# Patient Record
Sex: MALE | Race: WHITE | NOT HISPANIC OR LATINO | Employment: OTHER | URBAN - NONMETROPOLITAN AREA
[De-identification: names, ages, dates, MRNs, and addresses within clinical notes are randomized per-mention and may not be internally consistent; named-entity substitution may affect disease eponyms.]

---

## 2021-09-17 ENCOUNTER — APPOINTMENT (EMERGENCY)
Dept: CT IMAGING | Facility: HOSPITAL | Age: 76
End: 2021-09-17
Payer: COMMERCIAL

## 2021-09-17 ENCOUNTER — HOSPITAL ENCOUNTER (EMERGENCY)
Facility: HOSPITAL | Age: 76
Discharge: ANOTHER HOSPITAL | End: 2021-09-17
Attending: EMERGENCY MEDICINE | Admitting: EMERGENCY MEDICINE
Payer: COMMERCIAL

## 2021-09-17 ENCOUNTER — APPOINTMENT (EMERGENCY)
Dept: RADIOLOGY | Facility: HOSPITAL | Age: 76
End: 2021-09-17
Payer: COMMERCIAL

## 2021-09-17 VITALS
HEART RATE: 70 BPM | SYSTOLIC BLOOD PRESSURE: 166 MMHG | RESPIRATION RATE: 18 BRPM | WEIGHT: 290 LBS | OXYGEN SATURATION: 99 % | HEIGHT: 71 IN | TEMPERATURE: 98.9 F | DIASTOLIC BLOOD PRESSURE: 72 MMHG | BODY MASS INDEX: 40.6 KG/M2

## 2021-09-17 DIAGNOSIS — I60.9 SUBARACHNOID HEMORRHAGE (HCC): ICD-10-CM

## 2021-09-17 DIAGNOSIS — M54.2 NECK PAIN: ICD-10-CM

## 2021-09-17 DIAGNOSIS — I62.9 INTRACRANIAL HEMORRHAGE (HCC): Primary | ICD-10-CM

## 2021-09-17 DIAGNOSIS — R07.9 CHEST PAIN: ICD-10-CM

## 2021-09-17 LAB
ALBUMIN SERPL BCP-MCNC: 3.8 G/DL (ref 3.5–5)
ALP SERPL-CCNC: 64 U/L (ref 46–116)
ALT SERPL W P-5'-P-CCNC: 20 U/L (ref 12–78)
ANION GAP SERPL CALCULATED.3IONS-SCNC: 9 MMOL/L (ref 4–13)
APTT PPP: 33 SECONDS (ref 23–37)
AST SERPL W P-5'-P-CCNC: 12 U/L (ref 5–45)
BASOPHILS # BLD AUTO: 0.02 THOUSANDS/ΜL (ref 0–0.1)
BASOPHILS NFR BLD AUTO: 0 % (ref 0–1)
BILIRUB SERPL-MCNC: 0.98 MG/DL (ref 0.2–1)
BUN SERPL-MCNC: 12 MG/DL (ref 5–25)
CALCIUM SERPL-MCNC: 9.1 MG/DL (ref 8.3–10.1)
CHLORIDE SERPL-SCNC: 101 MMOL/L (ref 100–108)
CK SERPL-CCNC: 31 U/L (ref 39–308)
CO2 SERPL-SCNC: 26 MMOL/L (ref 21–32)
CREAT SERPL-MCNC: 0.76 MG/DL (ref 0.6–1.3)
EOSINOPHIL # BLD AUTO: 0.14 THOUSAND/ΜL (ref 0–0.61)
EOSINOPHIL NFR BLD AUTO: 1 % (ref 0–6)
ERYTHROCYTE [DISTWIDTH] IN BLOOD BY AUTOMATED COUNT: 13.1 % (ref 11.6–15.1)
GFR SERPL CREATININE-BSD FRML MDRD: 90 ML/MIN/1.73SQ M
GLUCOSE SERPL-MCNC: 122 MG/DL (ref 65–140)
HCT VFR BLD AUTO: 43.2 % (ref 36.5–49.3)
HGB BLD-MCNC: 15.1 G/DL (ref 12–17)
IMM GRANULOCYTES # BLD AUTO: 0.02 THOUSAND/UL (ref 0–0.2)
IMM GRANULOCYTES NFR BLD AUTO: 0 % (ref 0–2)
INR PPP: 2.84 (ref 0.84–1.19)
LACTATE SERPL-SCNC: 2.1 MMOL/L (ref 0.5–2)
LYMPHOCYTES # BLD AUTO: 0.65 THOUSANDS/ΜL (ref 0.6–4.47)
LYMPHOCYTES NFR BLD AUTO: 6 % (ref 14–44)
MAGNESIUM SERPL-MCNC: 2 MG/DL (ref 1.6–2.6)
MCH RBC QN AUTO: 32.1 PG (ref 26.8–34.3)
MCHC RBC AUTO-ENTMCNC: 35 G/DL (ref 31.4–37.4)
MCV RBC AUTO: 92 FL (ref 82–98)
MONOCYTES # BLD AUTO: 0.37 THOUSAND/ΜL (ref 0.17–1.22)
MONOCYTES NFR BLD AUTO: 4 % (ref 4–12)
NEUTROPHILS # BLD AUTO: 9.27 THOUSANDS/ΜL (ref 1.85–7.62)
NEUTS SEG NFR BLD AUTO: 89 % (ref 43–75)
NRBC BLD AUTO-RTO: 0 /100 WBCS
PLATELET # BLD AUTO: 194 THOUSANDS/UL (ref 149–390)
PMV BLD AUTO: 10.6 FL (ref 8.9–12.7)
POTASSIUM SERPL-SCNC: 3.7 MMOL/L (ref 3.5–5.3)
PROT SERPL-MCNC: 7 G/DL (ref 6.4–8.2)
PROTHROMBIN TIME: 29.1 SECONDS (ref 11.6–14.5)
RBC # BLD AUTO: 4.71 MILLION/UL (ref 3.88–5.62)
SODIUM SERPL-SCNC: 136 MMOL/L (ref 136–145)
TROPONIN I SERPL-MCNC: <0.02 NG/ML
WBC # BLD AUTO: 10.47 THOUSAND/UL (ref 4.31–10.16)

## 2021-09-17 PROCEDURE — 99292 CRITICAL CARE ADDL 30 MIN: CPT | Performed by: PHYSICIAN ASSISTANT

## 2021-09-17 PROCEDURE — 93005 ELECTROCARDIOGRAM TRACING: CPT

## 2021-09-17 PROCEDURE — 99291 CRITICAL CARE FIRST HOUR: CPT

## 2021-09-17 PROCEDURE — 86901 BLOOD TYPING SEROLOGIC RH(D): CPT | Performed by: PHYSICIAN ASSISTANT

## 2021-09-17 PROCEDURE — 96361 HYDRATE IV INFUSION ADD-ON: CPT

## 2021-09-17 PROCEDURE — 96374 THER/PROPH/DIAG INJ IV PUSH: CPT

## 2021-09-17 PROCEDURE — 84484 ASSAY OF TROPONIN QUANT: CPT | Performed by: PHYSICIAN ASSISTANT

## 2021-09-17 PROCEDURE — 85610 PROTHROMBIN TIME: CPT | Performed by: EMERGENCY MEDICINE

## 2021-09-17 PROCEDURE — 99291 CRITICAL CARE FIRST HOUR: CPT | Performed by: PHYSICIAN ASSISTANT

## 2021-09-17 PROCEDURE — 96365 THER/PROPH/DIAG IV INF INIT: CPT

## 2021-09-17 PROCEDURE — 85025 COMPLETE CBC W/AUTO DIFF WBC: CPT | Performed by: PHYSICIAN ASSISTANT

## 2021-09-17 PROCEDURE — 82550 ASSAY OF CK (CPK): CPT | Performed by: PHYSICIAN ASSISTANT

## 2021-09-17 PROCEDURE — 70496 CT ANGIOGRAPHY HEAD: CPT

## 2021-09-17 PROCEDURE — 80053 COMPREHEN METABOLIC PANEL: CPT | Performed by: PHYSICIAN ASSISTANT

## 2021-09-17 PROCEDURE — 36430 TRANSFUSION BLD/BLD COMPNT: CPT

## 2021-09-17 PROCEDURE — 96375 TX/PRO/DX INJ NEW DRUG ADDON: CPT

## 2021-09-17 PROCEDURE — P9017 PLASMA 1 DONOR FRZ W/IN 8 HR: HCPCS

## 2021-09-17 PROCEDURE — 86900 BLOOD TYPING SEROLOGIC ABO: CPT | Performed by: PHYSICIAN ASSISTANT

## 2021-09-17 PROCEDURE — 70498 CT ANGIOGRAPHY NECK: CPT

## 2021-09-17 PROCEDURE — 71045 X-RAY EXAM CHEST 1 VIEW: CPT

## 2021-09-17 PROCEDURE — 83735 ASSAY OF MAGNESIUM: CPT | Performed by: PHYSICIAN ASSISTANT

## 2021-09-17 PROCEDURE — 72125 CT NECK SPINE W/O DYE: CPT

## 2021-09-17 PROCEDURE — 36415 COLL VENOUS BLD VENIPUNCTURE: CPT | Performed by: PHYSICIAN ASSISTANT

## 2021-09-17 PROCEDURE — 70450 CT HEAD/BRAIN W/O DYE: CPT

## 2021-09-17 PROCEDURE — 83605 ASSAY OF LACTIC ACID: CPT | Performed by: PHYSICIAN ASSISTANT

## 2021-09-17 PROCEDURE — 85730 THROMBOPLASTIN TIME PARTIAL: CPT | Performed by: EMERGENCY MEDICINE

## 2021-09-17 PROCEDURE — 96376 TX/PRO/DX INJ SAME DRUG ADON: CPT

## 2021-09-17 RX ORDER — WARFARIN SODIUM 10 MG/1
10 TABLET ORAL
COMMUNITY

## 2021-09-17 RX ORDER — DEXAMETHASONE SODIUM PHOSPHATE 10 MG/ML
10 INJECTION, SOLUTION INTRAMUSCULAR; INTRAVENOUS ONCE
Status: COMPLETED | OUTPATIENT
Start: 2021-09-17 | End: 2021-09-17

## 2021-09-17 RX ORDER — DILTIAZEM HYDROCHLORIDE 5 MG/ML
10 INJECTION INTRAVENOUS ONCE
Status: COMPLETED | OUTPATIENT
Start: 2021-09-17 | End: 2021-09-17

## 2021-09-17 RX ORDER — MORPHINE SULFATE 4 MG/ML
4 INJECTION, SOLUTION INTRAMUSCULAR; INTRAVENOUS ONCE
Status: COMPLETED | OUTPATIENT
Start: 2021-09-17 | End: 2021-09-17

## 2021-09-17 RX ORDER — DIAZEPAM 5 MG/ML
2.5 INJECTION, SOLUTION INTRAMUSCULAR; INTRAVENOUS ONCE
Status: COMPLETED | OUTPATIENT
Start: 2021-09-17 | End: 2021-09-17

## 2021-09-17 RX ORDER — KETOROLAC TROMETHAMINE 30 MG/ML
15 INJECTION, SOLUTION INTRAMUSCULAR; INTRAVENOUS ONCE
Status: DISCONTINUED | OUTPATIENT
Start: 2021-09-17 | End: 2021-09-17

## 2021-09-17 RX ORDER — SODIUM CHLORIDE 9 MG/ML
125 INJECTION, SOLUTION INTRAVENOUS CONTINUOUS
Status: DISCONTINUED | OUTPATIENT
Start: 2021-09-17 | End: 2021-09-17 | Stop reason: HOSPADM

## 2021-09-17 RX ORDER — TRAMADOL HYDROCHLORIDE 50 MG/1
50 TABLET ORAL EVERY 6 HOURS PRN
COMMUNITY

## 2021-09-17 RX ADMIN — DEXAMETHASONE SODIUM PHOSPHATE 10 MG: 10 INJECTION, SOLUTION INTRAMUSCULAR; INTRAVENOUS at 14:00

## 2021-09-17 RX ADMIN — DILTIAZEM HYDROCHLORIDE 10 MG: 5 INJECTION INTRAVENOUS at 16:25

## 2021-09-17 RX ADMIN — MORPHINE SULFATE 2 MG: 2 INJECTION, SOLUTION INTRAMUSCULAR; INTRAVENOUS at 16:00

## 2021-09-17 RX ADMIN — DIAZEPAM 2.5 MG: 10 INJECTION, SOLUTION INTRAMUSCULAR; INTRAVENOUS at 16:01

## 2021-09-17 RX ADMIN — DIAZEPAM 2.5 MG: 10 INJECTION, SOLUTION INTRAMUSCULAR; INTRAVENOUS at 13:59

## 2021-09-17 RX ADMIN — MORPHINE SULFATE 4 MG: 4 INJECTION INTRAVENOUS at 15:06

## 2021-09-17 RX ADMIN — IOHEXOL 100 ML: 350 INJECTION, SOLUTION INTRAVENOUS at 16:30

## 2021-09-17 RX ADMIN — SODIUM CHLORIDE 125 ML/HR: 0.9 INJECTION, SOLUTION INTRAVENOUS at 16:25

## 2021-09-17 RX ADMIN — PHYTONADIONE 10 MG: 10 INJECTION, EMULSION INTRAMUSCULAR; INTRAVENOUS; SUBCUTANEOUS at 17:12

## 2021-09-17 RX ADMIN — SODIUM CHLORIDE 5 MG/HR: 0.9 INJECTION, SOLUTION INTRAVENOUS at 17:05

## 2021-09-17 RX ADMIN — Medication 3500 UNITS: at 17:04

## 2021-09-17 NOTE — EMTALA/ACUTE CARE TRANSFER
[address]  [facility] [address]  Dept: [address] [facility]                                         1945                              MRN [id number]    I have been informed of my rights regarding examination, treatment, and transfer   by Dr Hayley Salas DO    Benefits: Specialized equipment and/or services available at the receiving facility (Include comment)________________________, Continuity of care, Patient preference    Risks: Potential for delay in receiving treatment, Potential deterioration of medical condition, Loss of IV, Increased discomfort during transfer      Consent for Transfer:  I acknowledge that my medical condition has been evaluated and explained to me by the emergency department physician or other qualified medical person and/or my attending physician, who has recommended that I be transferred to the service of  Accepting Physician: Dr Gonsalo Hernandez at [address] [place] [address] [address] Name, [address] : [id number] [facility]  The above potential benefits of such transfer, the potential risks associated with such transfer, and the probable risks of not being transferred have been explained to me, and I fully understand them  The doctor has explained that, in my case, the benefits of transfer outweigh the risks  I agree to be transferred  I authorize the performance of emergency medical procedures and treatments upon me in both transit and upon arrival at the receiving facility  Additionally, I authorize the release of any and all medical records to the receiving facility and request they be transported with me, if possible  I understand that the safest mode of transportation during a medical emergency is an ambulance and that the Hospital advocates the use of this mode of transport   Risks of traveling to the receiving facility by car, including absence of medical control, life sustaining equipment, such as oxygen, and medical personnel has been explained to me and I fully understand them  (BURAK CORRECT BOX BELOW)  [  ]  I consent to the stated transfer and to be transported by ambulance/helicopter  [  ]  I consent to the stated transfer, but refuse transportation by ambulance and accept full responsibility for my transportation by car  I understand the risks of non-ambulance transfers and I exonerate the Hospital and its staff from any deterioration in my condition that results from this refusal     X___________________________________________    DATE  21  TIME________  Signature of patient or legally responsible individual signing on patient behalf           RELATIONSHIP TO PATIENT_________________________          Provider [id number]                                          1945                              MRN [id number]    A medical screening exam was performed on the above named patient  Based on the examination:    Condition Necessitating Transfer The primary encounter diagnosis was Neck pain  A diagnosis of Chest pain was also pertinent to this visit      Patient Condition: The patient has been stabilized such that within reasonable medical probability, no material deterioration of the patient condition or the condition of the unborn child(kathleen) is likely to result from the transfer    Reason for Transfer: Level of Care needed not available at this facility, Patient/Family request, No bed available at level of patient's needs    Transfer Requirements: Facility [address] [place] [address]   · Space available and qualified personnel available for treatment as acknowledged by    · Agreed to accept transfer and to provide appropriate medical treatment as acknowledged by       Dr Marti Aase  · Appropriate medical records of the examination and treatment of the patient are provided at the time of transfer   [address] _______  · Transfer will be performed by qualified personnel from    and appropriate transfer equipment as required, including the use of necessary and appropriate life support measures  Provider Certification: I have examined the patient and explained the following risks and benefits of being transferred/refusing transfer to the patient/family:  General risk, such as traffic hazards, adverse weather conditions, rough terrain or turbulence, possible failure of equipment (including vehicle or aircraft), or consequences of actions of persons outside the control of the transport personnel, Consent was not obtained as patient is committed to psychiatric facility and transfer is mandated, Unanticipated needs of medical equipment and personnel during transport, Risk of worsening condition, The possibility of a transport vehicle being unavailable      Based on these reasonable risks and benefits to the patient and/or the unborn child(kathleen), and based upon the information available at the time of the patients examination, I certify that the medical benefits reasonably to be expected from the provision of appropriate medical treatments at another medical facility outweigh the increasing risks, if any, to the individuals medical condition, and in the case of labor to the unborn child, from effecting the transfer      X____________________________________________ DATE 09/17/21        TIME_______      ORIGINAL - SEND TO MEDICAL RECORDS   COPY - SEND WITH PATIENT DURING TRANSFER

## 2021-09-17 NOTE — ED NOTES
Emergent fresh frozen plasma initiated with gael horton rn at bedside to verify emergent transfusion        Fariha Rosa RN  09/17/21 0665

## 2021-09-17 NOTE — ED PROVIDER NOTES
History  Chief Complaint   Patient presents with    Neck Pain     pt reports neck pain increasing over past 5 days   Chest Pain     pt reports chest pain increasing over past 5 days as well, worth with exertion     The patient is a 77year old male PMH bradycardia ( asymptomatic), DVT on coumadin, who presents to the ED via EMS from home for c/o neck pain and chest pain x 1 week  The patient states that he awoke a week ago with neck pain, he states that " he thought he may have slept wrong"  He states that this gradually worsened and four days ago was evaluated by his PCP  He has not taken any medications other than tramadol, but has noticed no relief  He has an upcoming appointment next week for a CT ordered by PCP  He denies any falls or traumas,current headache, blurred vision, fevers or chills shortness of breath abdominal pain nausea vomiting dizziness    He states over the last 6 days he has also noticed substernal chest pain  He states that this is present when he moves or with certain positions  He states that he is lying down for "too long" he develops a 6/10 sharp pain substernally  He states that this is relieved with changing position  He denies a cough or congestion  Upon further review of epic, patient did discuss frequent headaches with the primary care physician a few days ago  Patient states that he has been having waxing and waning headaches for the last week  He states that 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   He states that these occurred while he was around 22years old at that time  He states that over last week with his bilateral neck pain he has been having posterior headaches that intermittently radiates to the top of his head  He states that his worse headache was last evening rated at a 7 /10 has been taking Tylenol and tramadol without relief but with some improvement    Patient does not currently have a headache now but does admit to the neck pain      Neck Pain  Pain location:  Generalized neck  Quality:  Stabbing and shooting  Pain radiates to:  Does not radiate  Pain severity:  Moderate  Onset quality:  Gradual  Duration:  1 week  Timing:  Constant  Progression:  Worsening  Chronicity:  New  Relieved by:  Nothing  Worsened by:  Twisting  Ineffective treatments:  None tried  Associated symptoms: chest pain    Associated symptoms: no bladder incontinence, no leg pain and no tingling    Chest pain:     Quality: sharp      Severity:  Moderate    Onset quality:  Unable to specify    Duration:  5 days    Timing:  Constant    Progression:  Waxing and waning    Chronicity:  New  Risk factors: no recent epidural, no recent head injury and no recurrent falls        Prior to Admission Medications   Prescriptions Last Dose Informant Patient Reported? Taking?   traMADol (ULTRAM) 50 mg tablet   Yes Yes   Sig: Take 50 mg by mouth every 6 (six) hours as needed for moderate pain   warfarin (COUMADIN) 10 mg tablet   Yes Yes   Sig: Take 10 mg by mouth daily 10mg and 7 5mg alternating every other day      Facility-Administered Medications: None       Past Medical History:   Diagnosis Date    DVT (deep venous thrombosis) (HCC)     Knee pain        History reviewed  No pertinent surgical history  History reviewed  No pertinent family history  I have reviewed and agree with the history as documented  E-Cigarette/Vaping     E-Cigarette/Vaping Substances     Social History     Tobacco Use    Smoking status: Never Smoker    Smokeless tobacco: Never Used   Substance Use Topics    Alcohol use: Never    Drug use: Never       Review of Systems   Constitutional: Negative for chills  HENT: Negative for ear pain  Eyes: Negative for pain and visual disturbance  Respiratory: Negative for stridor  Cardiovascular: Positive for chest pain  Genitourinary: Negative for bladder incontinence, dysuria and hematuria  Musculoskeletal: Positive for neck pain   Negative for arthralgias  Skin: Negative for color change and rash  Neurological: Negative for tingling, seizures and speech difficulty  All other systems reviewed and are negative  Physical Exam  Physical Exam  Vitals and nursing note reviewed  Constitutional:       General: He is not in acute distress  Appearance: He is well-developed  HENT:      Head: Normocephalic and atraumatic  Eyes:      Pupils: Pupils are equal, round, and reactive to light  Cardiovascular:      Rate and Rhythm: Normal rate and regular rhythm  Heart sounds: Normal heart sounds  No murmur heard  Pulmonary:      Effort: Pulmonary effort is normal  No respiratory distress  Breath sounds: Normal breath sounds  Chest:      Chest wall: No tenderness  Abdominal:      General: Bowel sounds are normal       Palpations: Abdomen is soft  Tenderness: There is no abdominal tenderness  Musculoskeletal:      Cervical back: Normal range of motion  Muscular tenderness present  No spinous process tenderness  Right lower leg: No tenderness  No edema  Left lower leg: No tenderness  No edema  Skin:     General: Skin is warm and dry  Capillary Refill: Capillary refill takes less than 2 seconds  Neurological:      General: No focal deficit present  Mental Status: He is alert and oriented to person, place, and time  GCS: GCS eye subscore is 4  GCS verbal subscore is 5  GCS motor subscore is 6  Gait: Gait is intact     Psychiatric:         Behavior: Behavior normal          Vital Signs  ED Triage Vitals [09/17/21 1337]   Temperature Pulse Respirations Blood Pressure SpO2   98 9 °F (37 2 °C) (!) 45 18 (!) 208/86 97 %      Temp Source Heart Rate Source Patient Position - Orthostatic VS BP Location FiO2 (%)   Oral -- -- -- --      Pain Score       8           Vitals:    09/17/21 1545 09/17/21 1600 09/17/21 1615 09/17/21 1630   BP: (!) 221/95 (!) 181/72 (!) 181/72 150/87   Pulse: (!) 50 (!) 53  57 Visual Acuity      ED Medications  Medications   sodium chloride 0 9 % infusion (125 mL/hr Intravenous New Bag 9/17/21 1625)   phytonadione (AQUA-MEPHYTON) 10 mg/mL 10 mg in sodium chloride 0 9 % 50 mL IVPB (10 mg Intravenous New Bag 9/17/21 1712)   niCARdipine (CARDENE) 25 mg (STANDARD CONCENTRATION) in sodium chloride 0 9% 250 mL (5 mg/hr Intravenous New Bag 9/17/21 1705)   diazepam (VALIUM) injection 2 5 mg (2 5 mg Intravenous Given 9/17/21 1359)   dexamethasone (PF) (DECADRON) injection 10 mg (10 mg Intravenous Given 9/17/21 1400)   morphine (PF) 4 mg/mL injection 4 mg (4 mg Intravenous Given 9/17/21 1506)   diazepam (VALIUM) injection 2 5 mg (2 5 mg Intravenous Given 9/17/21 1601)   morphine injection 2 mg (2 mg Intravenous Given 9/17/21 1600)   diltiazem (CARDIZEM) injection 10 mg (10 mg Intravenous Given 9/17/21 1625)   iohexol (OMNIPAQUE) 350 MG/ML injection (SINGLE-DOSE) 100 mL (100 mL Intravenous Given 9/17/21 1630)   prothrombin complex conc human (KCENTRA) 3,500 Units (3,500 Units Intravenous Given 9/17/21 1704)       Diagnostic Studies  Results Reviewed     Procedure Component Value Units Date/Time    Lactic acid [[id number]]  (Abnormal) Collected: 09/17/21 1410    Lab Status: Final result Specimen: Blood from Arm, Right Updated: 09/17/21 1450     LACTIC ACID 2 1 mmol/L     Narrative:      Result may be elevated if tourniquet was used during collection      Comprehensive metabolic panel [[id number]] Collected: 09/17/21 1410    Lab Status: Final result Specimen: Blood from Arm, Right Updated: 09/17/21 1449     Sodium 136 mmol/L      Potassium 3 7 mmol/L      Chloride 101 mmol/L      CO2 26 mmol/L      ANION GAP 9 mmol/L      BUN 12 mg/dL      Creatinine 0 76 mg/dL      Glucose 122 mg/dL      Calcium 9 1 mg/dL      AST 12 U/L      ALT 20 U/L      Alkaline Phosphatase 64 U/L      Total Protein 7 0 g/dL      Albumin 3 8 g/dL      Total Bilirubin 0 98 mg/dL      eGFR 90 ml/min/1 73sq m     Narrative: National Kidney Disease Foundation guidelines for Chronic Kidney Disease (CKD):     Stage 1 with normal or high GFR (GFR > 90 mL/min/1 73 square meters)    Stage 2 Mild CKD (GFR = 60-89 mL/min/1 73 square meters)    Stage 3A Moderate CKD (GFR = 45-59 mL/min/1 73 square meters)    Stage 3B Moderate CKD (GFR = 30-44 mL/min/1 73 square meters)    Stage 4 Severe CKD (GFR = 15-29 mL/min/1 73 square meters)    Stage 5 End Stage CKD (GFR <15 mL/min/1 73 square meters)  Note: GFR calculation is accurate only with a steady state creatinine    Magnesium [[id number]]  (Normal) Collected: 09/17/21 1410    Lab Status: Final result Specimen: Blood from Arm, Right Updated: 09/17/21 1444     Magnesium 2 0 mg/dL     Troponin I [[id number]]  (Normal) Collected: 09/17/21 1410    Lab Status: Final result Specimen: Blood from Arm, Right Updated: 09/17/21 1442     Troponin I <0 02 ng/mL     CK Total with Reflex CKMB [[id number]]  (Abnormal) Collected: 09/17/21 1410    Lab Status: Final result Specimen: Blood from Arm, Right Updated: 09/17/21 1442     Total CK 31 U/L     Protime-INR [[id number]]  (Abnormal) Collected: 09/17/21 1410    Lab Status: Final result Specimen: Blood from Arm, Right Updated: 09/17/21 1436     Protime 29 1 seconds      INR 2 84    APTT [[id number]]  (Normal) Collected: 09/17/21 1410    Lab Status: Final result Specimen: Blood from Arm, Right Updated: 09/17/21 1436     PTT 33 seconds     CBC and differential [[id number]]  (Abnormal) Collected: 09/17/21 1410    Lab Status: Final result Specimen: Blood from Arm, Right Updated: 09/17/21 1418     WBC 10 47 Thousand/uL      RBC 4 71 Million/uL      Hemoglobin 15 1 g/dL      Hematocrit 43 2 %      MCV 92 fL      MCH 32 1 pg      MCHC 35 0 g/dL      RDW 13 1 %      MPV 10 6 fL      Platelets 543 Thousands/uL      nRBC 0 /100 WBCs      Neutrophils Relative 89 %      Immat GRANS % 0 %      Lymphocytes Relative 6 %      Monocytes Relative 4 %      Eosinophils Relative 1 % Basophils Relative 0 %      Neutrophils Absolute 9 27 Thousands/µL      Immature Grans Absolute 0 02 Thousand/uL      Lymphocytes Absolute 0 65 Thousands/µL      Monocytes Absolute 0 37 Thousand/µL      Eosinophils Absolute 0 14 Thousand/µL      Basophils Absolute 0 02 Thousands/µL                  CTA head and neck with and without contrast   Final Result by Henrique Stover MD (09/17 4020)      Acute small-volume intraventricular hemorrhage, trace subarachnoid hemorrhage along bilateral cerebral convexities, and mild hydrocephalus  This was best seen on earlier same day CT head without contrast       Negative CTA head and neck for aneurysm, large vessel occlusion, dissection, or high-grade stenosis  Mild atherosclerotic disease of the head and neck, as detailed above  Additional chronic/incidental findings as detailed above  Workstation performed: [id number]         CT head without contrast   Final Result by Candie Barahona MD (09/17 6860)      Evidence of intraventricular hemorrhage identified with hydrocephalus as well as suspected small amount of subarachnoid hemorrhage  Definitive source for the bleeding not identified although remaining concerning for occult aneurysm  Emergent CTA    recommended  I personally discussed this study with Jennifer Moreno on 9/17/2021 at 3:55 PM              Workstation performed: [id number]         XR chest 1 view portable   Final Result by Blas Xie MD (09/17 1500)      No acute cardiopulmonary disease  Workstation performed: [id number]         CT cervical spine without contrast   Final Result by Katie Marrero MD (09/17 8861)      No cervical spine fracture or traumatic malalignment  Mild diffuse degenerative changes without critical central stenosis        Workstation performed: [id number]                    Procedures  ECG 12 Lead Documentation Only    Date/Time: 9/17/2021 2:04 PM  Performed by: Rolando Joseph PA-C  Authorized by: Rolando Joseph PA-C     Indications / Diagnosis:  Neck pain, cp  Patient location:  ED  Previous ECG:     Previous ECG:  Unavailable  Interpretation:     Interpretation: abnormal    Rate:     ECG rate:  44    ECG rate assessment: bradycardic    Rhythm:     Rhythm: sinus bradycardia and A-V block    Conduction:     Conduction: abnormal      Abnormal conduction: incomplete RBBB    ST segments:     ST segments:  Normal  T waves:     T waves: normal      CriticalCare Time  Performed by: Rolando Joseph PA-C  Authorized by: Rolando Joseph PA-C     Critical care provider statement:     Critical care time (minutes):  120    Critical care time was exclusive of:  Separately billable procedures and treating other patients    Critical care was necessary to treat or prevent imminent or life-threatening deterioration of the following conditions: subarachnoid hemmorrhage     Critical care was time spent personally by me on the following activities:  Development of treatment plan with patient or surrogate, blood draw for specimens, obtaining history from patient or surrogate, evaluation of patient's response to treatment, examination of patient, discussions with primary provider, discussions with consultants, review of old charts, re-evaluation of patient's condition, ordering and review of laboratory studies, ordering and performing treatments and interventions, interpretation of cardiac output measurements and ordering and review of radiographic studies    I assumed direction of critical care for this patient from another provider in my specialty: no               ED Course  ED Course as of Sep 17 1718   Fri Sep 17, 2021   1517 Patients pain improved to 2/10 in the neck, patient is not having chest pain       1552 Intracranial hemmorrhage, posterior lateral +maybe subarachoid hemorrhead       1628   Neurology recommendations for IV VIT K, blood pressure control less than 342 systolic, K[facility]      [address] ICU accepted admission                 HEART Risk Score      Most Recent Value   Heart Score Risk Calculator   History  0 Filed at: 09/17/2021 1519   ECG  0 Filed at: 09/17/2021 1519   Age  2 Filed at: 09/17/2021 1519   Risk Factors  1 Filed at: 09/17/2021 1519   Troponin  0 Filed at: 09/17/2021 1519   HEART Score  3 Filed at: 09/17/2021 1519                      SBIRT 20yo+      Most Recent Value   SBIRT (25 yo +)   In order to provide better care to our patients, we are screening all of our patients for alcohol and drug use  Would it be okay to ask you these screening questions? No Filed at: 09/17/2021 1341                    MDM  Number of Diagnoses or Management Options  Chest pain  Intracranial hemorrhage (HCC)  Neck pain  Subarachnoid hemorrhage (HCC)  Diagnosis management comments: Patient did have improvement with the medications for his neck pain  Patient denies chest pain in the emergency department  Patient's CT head did demonstrate a intracranial hemorrhage and a possible trace subarachnoid  Patient denied any falls or traumas  Patient did not current headache  Patient was found to be hypertensive in the emergency department  Did offer transfer to [facility] for neurology evaluation and ICU  He chose [facility] due to location    Spoke with neurology as above and placed orders as noted for [facility]ra, IV vitamin K,BP control  CTA obtained, did not demonstrate aneurysm    Blood consent was obtained as well     [facility] [place] [address] ICU [facility]       Amount and/or Complexity of Data Reviewed  Clinical lab tests: ordered and reviewed  Tests in the radiology section of CPT®: ordered and reviewed  Decide to obtain previous medical records or to obtain history from someone other than the patient: yes  Review and summarize past medical records: yes  Discuss the patient with other providers: yes  Independent visualization of images, tracings, or specimens: yes    Risk of Complications, Morbidity, and/or Mortality  Presenting problems: high  Diagnostic procedures: high  Management options: high    Patient Progress  Patient progress: stable      Disposition  Final diagnoses:   Neck pain   Chest pain   Intracranial hemorrhage ([address] )   Subarachnoid hemorrhage ([facility] [address] )     Time reflects when diagnosis was documented in both MDM as applicable and the Disposition within this note     Time User Action Codes Description Comment    9/17/2021  2:09 PM Maurice Liming Add [M54 2] Neck pain     9/17/2021  2:10 PM Maurice Liming Add [R07 9] Chest pain     9/17/2021  4:48 PM Maurice Liming Add [I62 9] Intracranial hemorrhage ([facility] [address] )     9/17/2021  4:48 PM Maurice Liming Modify [M54 2] Neck pain     9/17/2021  4:48 PM Maurice Liming Modify [I62 9] Intracranial hemorrhage ([facility] [address] )     9/17/2021  5:13 PM Maurice Liming Add [I60 9] Subarachnoid hemorrhage [facility])       ED Disposition     ED Disposition Condition Date/Time Comment    Transfer to Another Facility-In Network  Fri Sep 17, 2021  4:42 PM [address] [place] [address] should be transferred out to [address] [place] [address]           MD Documentation      Most Recent Value   Patient Condition  The patient has been stabilized such that within reasonable medical probability, no material deterioration of the patient condition or the condition of the unborn child(kathleen) is likely to result from the transfer   Reason for Transfer  Level of Care needed not available at this facility, Patient/Family request, No bed available at level of patient's needs   Benefits of Transfer  Specialized equipment and/or services available at the receiving facility (Include comment)________________________, Continuity of care, Patient preference   Risks of Transfer  Potential for delay in receiving treatment, Potential deterioration of medical condition, Loss of IV, Increased discomfort during transfer   Accepting Physician  Dr Azael Wright Name, Bruce Dickey PA-C, Dr Queta Santoyo    Provider Certification  General risk, such as traffic hazards, adverse weather conditions, rough terrain or turbulence, possible failure of equipment (including vehicle or aircraft), or consequences of actions of persons outside the control of the transport personnel, Consent was not obtained as patient is committed to psychiatric facility and transfer is mandated, Unanticipated needs of medical equipment and personnel during transport, Risk of worsening condition, The possibility of a transport vehicle being unavailable      RN Documentation      [address] Name, [address]   [facility]   Transport Mode  Helicopter      Follow-up Information    None         Patient's Medications   Discharge Prescriptions    No medications on file     No discharge procedures on file      PDMP Review     None          ED Provider  Electronically Signed by           Mary Gill PA-C  09/17/21 2265

## 2021-09-18 LAB
ABO GROUP BLD: NORMAL
RH BLD: POSITIVE

## 2021-09-19 LAB
ABO GROUP BLD BPU: NORMAL
ABO GROUP BLD BPU: NORMAL
ATRIAL RATE: 44 BPM
BPU ID: NORMAL
BPU ID: NORMAL
P AXIS: 76 DEGREES
PR INTERVAL: 210 MS
QRS AXIS: 8 DEGREES
QRSD INTERVAL: 108 MS
QT INTERVAL: 488 MS
QTC INTERVAL: 417 MS
T WAVE AXIS: 43 DEGREES
UNIT DISPENSE STATUS: NORMAL
UNIT DISPENSE STATUS: NORMAL
UNIT PRODUCT CODE: NORMAL
UNIT PRODUCT CODE: NORMAL
UNIT PRODUCT VOLUME: 191 ML
UNIT PRODUCT VOLUME: 303 ML
UNIT RH: NORMAL
UNIT RH: NORMAL
VENTRICULAR RATE: 44 BPM

## 2021-09-19 PROCEDURE — 93010 ELECTROCARDIOGRAM REPORT: CPT | Performed by: INTERNAL MEDICINE
